# Patient Record
Sex: FEMALE | Race: WHITE | Employment: UNEMPLOYED | URBAN - METROPOLITAN AREA
[De-identification: names, ages, dates, MRNs, and addresses within clinical notes are randomized per-mention and may not be internally consistent; named-entity substitution may affect disease eponyms.]

---

## 2020-10-12 LAB
ABO, EXTERNAL RESULT: NORMAL
HEP B, EXTERNAL RESULT: NEGATIVE
HEPATITIS C ANTIBODY, EXTERNAL RESULT: NEGATIVE
HIV, EXTERNAL RESULT: NON REACTIVE
RH FACTOR, EXTERNAL RESULT: POSITIVE
RUBELLA TITER, EXTERNAL RESULT: NORMAL

## 2021-01-18 LAB — RPR, EXTERNAL RESULT: NON REACTIVE

## 2021-04-05 ENCOUNTER — HOSPITAL ENCOUNTER (INPATIENT)
Age: 26
LOS: 1 days | Discharge: HOME | DRG: 560 | End: 2021-04-06
Attending: OBSTETRICS & GYNECOLOGY | Admitting: OBSTETRICS & GYNECOLOGY
Payer: COMMERCIAL

## 2021-04-05 PROBLEM — Z37.9 NORMAL LABOR: Status: ACTIVE | Noted: 2021-04-05

## 2021-04-05 LAB
A/G RATIO: 1 (ref 1.1–2.2)
ABO/RH: NORMAL
ALBUMIN SERPL-MCNC: 2.9 G/DL (ref 3.4–5)
ALP BLD-CCNC: 181 U/L (ref 40–129)
ALT SERPL-CCNC: 88 U/L (ref 10–40)
AMPHETAMINE SCREEN, URINE: NORMAL
ANION GAP SERPL CALCULATED.3IONS-SCNC: 16 MMOL/L (ref 3–16)
ANTIBODY SCREEN: NORMAL
AST SERPL-CCNC: 58 U/L (ref 15–37)
BARBITURATE SCREEN URINE: NORMAL
BASOPHILS ABSOLUTE: 0.1 K/UL (ref 0–0.2)
BASOPHILS RELATIVE PERCENT: 0.8 %
BENZODIAZEPINE SCREEN, URINE: NORMAL
BILIRUB SERPL-MCNC: 0.3 MG/DL (ref 0–1)
BUN BLDV-MCNC: 9 MG/DL (ref 7–20)
BUPRENORPHINE URINE: NORMAL
CALCIUM SERPL-MCNC: 9.1 MG/DL (ref 8.3–10.6)
CANNABINOID SCREEN URINE: NORMAL
CHLORIDE BLD-SCNC: 104 MMOL/L (ref 99–110)
CO2: 17 MMOL/L (ref 21–32)
COCAINE METABOLITE SCREEN URINE: NORMAL
CREAT SERPL-MCNC: 0.7 MG/DL (ref 0.6–1.1)
EOSINOPHILS ABSOLUTE: 0 K/UL (ref 0–0.6)
EOSINOPHILS RELATIVE PERCENT: 0.2 %
GFR AFRICAN AMERICAN: >60
GFR NON-AFRICAN AMERICAN: >60
GLOBULIN: 3 G/DL
GLUCOSE BLD-MCNC: 90 MG/DL (ref 70–99)
HCT VFR BLD CALC: 32.3 % (ref 36–48)
HEMOGLOBIN: 10.9 G/DL (ref 12–16)
LYMPHOCYTES ABSOLUTE: 1.7 K/UL (ref 1–5.1)
LYMPHOCYTES RELATIVE PERCENT: 20.2 %
Lab: NORMAL
MCH RBC QN AUTO: 29.1 PG (ref 26–34)
MCHC RBC AUTO-ENTMCNC: 33.6 G/DL (ref 31–36)
MCV RBC AUTO: 86.7 FL (ref 80–100)
METHADONE SCREEN, URINE: NORMAL
MONOCYTES ABSOLUTE: 0.5 K/UL (ref 0–1.3)
MONOCYTES RELATIVE PERCENT: 6.4 %
NEUTROPHILS ABSOLUTE: 5.9 K/UL (ref 1.7–7.7)
NEUTROPHILS RELATIVE PERCENT: 72.4 %
OPIATE SCREEN URINE: NORMAL
OXYCODONE URINE: NORMAL
PDW BLD-RTO: 13.2 % (ref 12.4–15.4)
PH UA: 6
PHENCYCLIDINE SCREEN URINE: NORMAL
PLATELET # BLD: 224 K/UL (ref 135–450)
PMV BLD AUTO: 11.3 FL (ref 5–10.5)
POTASSIUM SERPL-SCNC: 4.4 MMOL/L (ref 3.5–5.1)
PROPOXYPHENE SCREEN: NORMAL
RBC # BLD: 3.73 M/UL (ref 4–5.2)
SARS-COV-2, NAAT: NOT DETECTED
SODIUM BLD-SCNC: 137 MMOL/L (ref 136–145)
TOTAL PROTEIN: 5.9 G/DL (ref 6.4–8.2)
TOTAL SYPHILLIS IGG/IGM: NORMAL
WBC # BLD: 8.2 K/UL (ref 4–11)

## 2021-04-05 PROCEDURE — 86850 RBC ANTIBODY SCREEN: CPT

## 2021-04-05 PROCEDURE — 86900 BLOOD TYPING SEROLOGIC ABO: CPT

## 2021-04-05 PROCEDURE — 85025 COMPLETE CBC W/AUTO DIFF WBC: CPT

## 2021-04-05 PROCEDURE — 51701 INSERT BLADDER CATHETER: CPT

## 2021-04-05 PROCEDURE — 87635 SARS-COV-2 COVID-19 AMP PRB: CPT

## 2021-04-05 PROCEDURE — 86780 TREPONEMA PALLIDUM: CPT

## 2021-04-05 PROCEDURE — 2580000003 HC RX 258: Performed by: OBSTETRICS & GYNECOLOGY

## 2021-04-05 PROCEDURE — 0UQMXZZ REPAIR VULVA, EXTERNAL APPROACH: ICD-10-PCS | Performed by: OBSTETRICS & GYNECOLOGY

## 2021-04-05 PROCEDURE — 2500000003 HC RX 250 WO HCPCS

## 2021-04-05 PROCEDURE — 80307 DRUG TEST PRSMV CHEM ANLYZR: CPT

## 2021-04-05 PROCEDURE — 80053 COMPREHEN METABOLIC PANEL: CPT

## 2021-04-05 PROCEDURE — 6370000000 HC RX 637 (ALT 250 FOR IP): Performed by: OBSTETRICS & GYNECOLOGY

## 2021-04-05 PROCEDURE — 86901 BLOOD TYPING SEROLOGIC RH(D): CPT

## 2021-04-05 PROCEDURE — 1220000000 HC SEMI PRIVATE OB R&B

## 2021-04-05 PROCEDURE — 6360000002 HC RX W HCPCS: Performed by: OBSTETRICS & GYNECOLOGY

## 2021-04-05 PROCEDURE — 7200000001 HC VAGINAL DELIVERY

## 2021-04-05 RX ORDER — LIDOCAINE HYDROCHLORIDE 10 MG/ML
INJECTION, SOLUTION EPIDURAL; INFILTRATION; INTRACAUDAL; PERINEURAL
Status: COMPLETED
Start: 2021-04-05 | End: 2021-04-05

## 2021-04-05 RX ORDER — IBUPROFEN 400 MG/1
800 TABLET ORAL EVERY 8 HOURS
Status: DISCONTINUED | OUTPATIENT
Start: 2021-04-05 | End: 2021-04-06 | Stop reason: HOSPADM

## 2021-04-05 RX ORDER — LIDOCAINE HYDROCHLORIDE 10 MG/ML
10 INJECTION, SOLUTION EPIDURAL; INFILTRATION; INTRACAUDAL; PERINEURAL ONCE
Status: COMPLETED | OUTPATIENT
Start: 2021-04-05 | End: 2021-04-05

## 2021-04-05 RX ORDER — LANOLIN 100 %
OINTMENT (GRAM) TOPICAL PRN
Status: DISCONTINUED | OUTPATIENT
Start: 2021-04-05 | End: 2021-04-06 | Stop reason: HOSPADM

## 2021-04-05 RX ORDER — ACETAMINOPHEN 325 MG/1
650 TABLET ORAL EVERY 4 HOURS PRN
Status: DISCONTINUED | OUTPATIENT
Start: 2021-04-05 | End: 2021-04-06 | Stop reason: HOSPADM

## 2021-04-05 RX ORDER — METHYLERGONOVINE MALEATE 0.2 MG/ML
200 INJECTION INTRAVENOUS
Status: ACTIVE | OUTPATIENT
Start: 2021-04-05 | End: 2021-04-05

## 2021-04-05 RX ORDER — HYDROCODONE BITARTRATE AND ACETAMINOPHEN 5; 325 MG/1; MG/1
1 TABLET ORAL EVERY 4 HOURS PRN
Status: DISCONTINUED | OUTPATIENT
Start: 2021-04-05 | End: 2021-04-06 | Stop reason: HOSPADM

## 2021-04-05 RX ORDER — DOCUSATE SODIUM 100 MG/1
100 CAPSULE, LIQUID FILLED ORAL 2 TIMES DAILY
Status: DISCONTINUED | OUTPATIENT
Start: 2021-04-05 | End: 2021-04-06 | Stop reason: HOSPADM

## 2021-04-05 RX ORDER — HYDROCODONE BITARTRATE AND ACETAMINOPHEN 5; 325 MG/1; MG/1
2 TABLET ORAL EVERY 4 HOURS PRN
Status: DISCONTINUED | OUTPATIENT
Start: 2021-04-05 | End: 2021-04-06 | Stop reason: HOSPADM

## 2021-04-05 RX ORDER — SODIUM CHLORIDE 0.9 % (FLUSH) 0.9 %
10 SYRINGE (ML) INJECTION EVERY 12 HOURS SCHEDULED
Status: DISCONTINUED | OUTPATIENT
Start: 2021-04-05 | End: 2021-04-06 | Stop reason: HOSPADM

## 2021-04-05 RX ORDER — SODIUM CHLORIDE 0.9 % (FLUSH) 0.9 %
10 SYRINGE (ML) INJECTION PRN
Status: DISCONTINUED | OUTPATIENT
Start: 2021-04-05 | End: 2021-04-06 | Stop reason: HOSPADM

## 2021-04-05 RX ORDER — LABETALOL HYDROCHLORIDE 5 MG/ML
20 INJECTION, SOLUTION INTRAVENOUS ONCE
Status: DISCONTINUED | OUTPATIENT
Start: 2021-04-05 | End: 2021-04-06 | Stop reason: HOSPADM

## 2021-04-05 RX ORDER — OXYTOCIN 10 [USP'U]/ML
INJECTION, SOLUTION INTRAMUSCULAR; INTRAVENOUS
Status: DISCONTINUED
Start: 2021-04-05 | End: 2021-04-05 | Stop reason: WASHOUT

## 2021-04-05 RX ADMIN — IBUPROFEN 800 MG: 400 TABLET, FILM COATED ORAL at 08:50

## 2021-04-05 RX ADMIN — Medication 166.7 ML: at 03:51

## 2021-04-05 RX ADMIN — IBUPROFEN 800 MG: 400 TABLET, FILM COATED ORAL at 16:28

## 2021-04-05 RX ADMIN — LIDOCAINE HYDROCHLORIDE 10 ML: 10 INJECTION, SOLUTION EPIDURAL; INFILTRATION; INTRACAUDAL; PERINEURAL at 03:46

## 2021-04-05 RX ADMIN — Medication 87.3 MILLI-UNITS/MIN: at 04:02

## 2021-04-05 RX ADMIN — DOCUSATE SODIUM 100 MG: 100 CAPSULE, LIQUID FILLED ORAL at 21:00

## 2021-04-05 RX ADMIN — SODIUM CHLORIDE, PRESERVATIVE FREE 10 ML: 5 INJECTION INTRAVENOUS at 21:00

## 2021-04-05 NOTE — L&D DELIVERY NOTE
Department of Obstetrics and Gynecology  Spontaneous Vaginal Delivery Note      [place]    PRENATAL CARE: Complicated by: none    Pre-operative Diagnosis:  active labor and SROM      Post-operative Diagnosis: The same    Additional Procedures: perineal laceration repair    Infant: living, viable female infant, Apgars 9/9, weight pending kangaroo care      Anesthesia:  none    Specimen: none    QBL: 250 mL     Condition: mother and infant stable in LDR    Delivery Summary: Patient is [place] is a 31 y.o. [id number] female at 37w0d who presented to Labor and Delivery in active labor. She was found to be 9 cm on initial exam. Dr. La Ko was notified and was en route and I was called to care for the patient in the interim as the in house physician. Patient quickly dilated to complete and had an insuppressible urge to push. With subsequent contractions, she pushed for a spontaneous vaginal delivery in [place]. The cord was clamped and cut after 60 seconds. 10 Units of Pitocin in 500 ml of LR was started IV. A superficial right labial laceration was noted; Lidocaine 1% 5mL was injected and the site was repaired with 3-0 Vicryl in standard fashion to achieve hemostasis. Placenta, cord and membranes delivered at 30 minutes. Uterus was not explored. Uterus was firm and at the level of the umbilicus. Vaginal sweep was done, laps count was correct. Mother and  left stable to recovery.      Electronically signed by Kitty Alvardao MD on 2021 at 3:59 AM

## 2021-04-05 NOTE — PLAN OF CARE
Problem: Pain:  Goal: Control of acute pain  Description: Control of acute pain  Outcome: Met This Shift     Problem: Pain:  Goal: Pain level will decrease  Description: Pain level will decrease  Outcome: Ongoing  Goal: Control of chronic pain  Description: Control of chronic pain  Outcome: Ongoing     Problem: Discharge Planning:  Goal: Discharged to appropriate level of care  Description: Discharged to appropriate level of care  Outcome: Ongoing     Problem: Constipation:  Goal: Bowel elimination is within specified parameters  Description: Bowel elimination is within specified parameters  Outcome: Ongoing     Problem: Fluid Volume - Imbalance:  Goal: Absence of imbalanced fluid volume signs and symptoms  Description: Absence of imbalanced fluid volume signs and symptoms  Outcome: Ongoing  Goal: Absence of postpartum hemorrhage signs and symptoms  Description: Absence of postpartum hemorrhage signs and symptoms  Outcome: Ongoing     Problem: Infection - Risk of, Puerperal Infection:  Goal: Will show no infection signs and symptoms  Description: Will show no infection signs and symptoms  Outcome: Ongoing     Problem: Mood - Altered:  Goal: Mood stable  Description: Mood stable  Outcome: Ongoing     Problem: Pain - Acute:  Goal: Pain level will decrease  Description: Pain level will decrease  Outcome: Ongoing

## 2021-04-05 NOTE — LACTATION NOTE
This note was copied from a baby's chart. Lactation Progress Note  Initial Consult    Data: Referral received per RN. Action: LC to room. Mother resting in bed. Infant sleeping, swaddled in mother's arms, showing no hunger cues at this time. Mother states agreeable to consult from [facility] at this time. I reviewed Care Plan for First 24 Hours of Life already in patient binder. Discussed recognizing hunger cues and offering the breast when cues are shown. Encouraged breastfeeding on demand and attempting/offering at least every 3 hours. Informed infant may have one 5 hour stretch of sleep in a 24 hour period. Encouraged unlimited skin to skin contact with infant and reviewed benefits including better temperature, heart rate, respiration, blood pressure, and blood sugar regulation. Also increased bonding and milk supply associated with skin to skin contact. Discussed feeding positions, latch on techniques, signs of milk transfer, output goals and normal feeding/sleeping behaviors. I referred mother to binder for additional information about breastfeeding and skin to skin contact. Discussed hand expression with mother. Mother states she is easily able to hand express drops to infant. Reinforced importance of positioning infant nose to nipple, belly to belly, waiting for wide open mouth, and bringing baby onto breast to ensure a deep latch. Discussed importance of obtaining deep latch to ensure proper milk transfer, milk production and supply and maternal comfort. Mother has breastfeeding hx of attempting with her first child (now 6 years), but switching to bottles shortly after delivery of that child because he wouldn't latch. The mother requests I initiate process for a breast pump through insurance. FOB is bringing insurance card in since it is not in system yet. Will fax pump script once insurance information is put in.     Gave resources for reverse pressure softening and breastfeeding support

## 2021-04-05 NOTE — H&P
Department of Obstetrics and Gynecology   Obstetrics History and Physical        CHIEF COMPLAINT:  contractions    HISTORY OF PRESENT ILLNESS:    Fer Jenkins  is a 31 y.o. [id number] female at 37w0d presents with a chief complaint as above and is being admitted for active phase labor    Estimated Due Date: Estimated Date of Delivery: 21    PRENATAL CARE: Complicated by: none    PAST OB HISTORY:  OB History        3    Para   2    Term   1       1    AB   1    Living   2       SAB        TAB        Ectopic        Molar        Multiple   0    Live Births   2              Past Medical History:    History reviewed. No pertinent past medical history. Past Surgical History:    History reviewed. No pertinent surgical history. Allergies:  Patient has no known allergies.   Social History:    Social History     Socioeconomic History    Marital status:      Spouse name: Not on file    Number of children: Not on file    Years of education: Not on file    Highest education level: Not on file   Occupational History    Not on file   Social Needs    Financial resource strain: Not on file    Food insecurity     Worry: Not on file     Inability: Not on file    Transportation needs     Medical: Not on file     Non-medical: Not on file   Tobacco Use    Smoking status: Never Smoker    Smokeless tobacco: Never Used   Substance and Sexual Activity    Alcohol use: Not Currently    Drug use: Never    Sexual activity: Yes     Partners: Male   Lifestyle    Physical activity     Days per week: Not on file     Minutes per session: Not on file    Stress: Not on file   Relationships    Social connections     Talks on phone: Not on file     Gets together: Not on file     Attends Orthodox service: Not on file     Active member of club or organization: Not on file     Attends meetings of clubs or organizations: Not on file     Relationship status: Not on file    Intimate partner violence     Fear of current or ex partner: Not on file     Emotionally abused: Not on file     Physically abused: Not on file     Forced sexual activity: Not on file   Other Topics Concern    Not on file   Social History Narrative    Not on file     Family History:   History reviewed. No pertinent family history. Medications Prior to Admission:  Medications Prior to Admission: Prenatal MV-Min-Fe Fum-FA-DHA (PRENATAL 1 PO), Take by mouth    REVIEW OF SYSTEMS:  negative     PHYSICAL EXAM:    Vitals:    04/05/21 0513 04/05/21 0549 04/05/21 0557 04/05/21 0631   BP: (!) 159/83 (!) 175/90 (!) 176/90 (!) 157/84   Pulse: 65 64 64 63   Resp:       Temp:       TempSrc:       Weight:       Height:         General appearance:  awake, alert, cooperative, no apparent distress, and appears stated age  Neurologic:  Awake, alert, oriented to name, place and time.     Lungs:  No increased work of breathing, good air exchange  Abdomen:  Soft, non tender, gravid, fundal height consistent with the gestational age, EFW by Leopold's maneuvers is AGA  Pelvis:  Adequate pelvis  Cervix: 9cm on admit  Contraction frequency: every 2 minutes  Membranes:  Intact  Labs:   CBC:   Lab Results   Component Value Date    WBC 8.2 04/05/2021    RBC 3.73 04/05/2021    HGB 10.9 04/05/2021    HCT 32.3 04/05/2021    MCV 86.7 04/05/2021    MCH 29.1 04/05/2021    MCHC 33.6 04/05/2021    RDW 13.2 04/05/2021     04/05/2021    MPV 11.3 04/05/2021       ASSESSMENT:  <principal problem not specified>    PLAN: Admit  Labor: Routine labor orders  Fetus: Reassuring  GBS: Negative    Electronically signed by Samm Reeves MD on 4/5/2021 at 7:49 AM

## 2021-04-05 NOTE — FLOWSHEET NOTE
Pt taken to triage via wheelchair by primary RN and Charge RN. Pt panting stating she felt a \"pop\", took a bath and became increasing uncomfortable while at home. Pt at this time stating she is feeling some pressure/pain but denies pressure with no contraction. Pt at standing at bedside panting/gagging, assisted to bed. SVE 9/100.  notified and en route. Pt transferred to L&D room 1928. Admission history obtained. Iv started.

## 2021-04-05 NOTE — H&P
Fear of current or ex partner: Not on file     Emotionally abused: Not on file     Physically abused: Not on file     Forced sexual activity: Not on file   Other Topics Concern    Not on file   Social History Narrative    Not on file     Family History:   History reviewed. No pertinent family history. Medications Prior to Admission:  Medications Prior to Admission: Prenatal MV-Min-Fe Fum-FA-DHA (PRENATAL 1 PO), Take by mouth    REVIEW OF SYSTEMS:  Active labor      PHYSICAL EXAM:    Vitals:    04/05/21 0513 04/05/21 0549 04/05/21 0557 04/05/21 0631   BP: (!) 159/83 (!) 175/90 (!) 176/90 (!) 157/84   Pulse: 65 64 64 63   Resp:       Temp:       TempSrc:       Weight:       Height:         General appearance:  awake, alert, cooperative, no apparent distress, and appears stated age  Neurologic:  Awake, alert, oriented to name, place and time.     Lungs:  No increased work of breathing, good air exchange  Abdomen:  Soft, non tender, gravid, fundal height consistent with the gestational age  Pelvis:  Adequate pelvis  Cervix: 9/100  Contraction frequency: every 2 minutes  Membranes:  Ruptured   Labs: GBS negative, Covid negative     ASSESSMENT:  Pt is a 26y/o [id number] at 37w0d presenting in active labor    PLAN: Admit  Labor: Routine labor orders, precipitous labor and delivery   Fetus: Reassuring  GBS: negative     Electronically signed by Reina Coleman MD on 4/5/2021 at 7:26 AM

## 2021-04-05 NOTE — FLOWSHEET NOTE
Pt c/o urge to void/pressure moderate bleeding noted. Assisted pt to RR, unable to void, lemon sized clot passed. Assisted back to bed bladder emptied per straight cath, 100 ml of jayant urine noted. Small clot passed. Fundus firm U/U, bleeding small.

## 2021-04-05 NOTE — FLOWSHEET NOTE
RN called lab to verify un resulted lab work.  said sample was just taken back and to \"give it a little while\".

## 2021-04-06 VITALS
SYSTOLIC BLOOD PRESSURE: 151 MMHG | BODY MASS INDEX: 40.32 KG/M2 | HEIGHT: 59 IN | HEART RATE: 91 BPM | TEMPERATURE: 98 F | DIASTOLIC BLOOD PRESSURE: 94 MMHG | RESPIRATION RATE: 16 BRPM | WEIGHT: 200 LBS

## 2021-04-06 PROCEDURE — 6370000000 HC RX 637 (ALT 250 FOR IP): Performed by: OBSTETRICS & GYNECOLOGY

## 2021-04-06 PROCEDURE — 2580000003 HC RX 258: Performed by: OBSTETRICS & GYNECOLOGY

## 2021-04-06 RX ORDER — NIFEDIPINE 30 MG/1
30 TABLET, EXTENDED RELEASE ORAL DAILY
Status: DISCONTINUED | OUTPATIENT
Start: 2021-04-06 | End: 2021-04-06 | Stop reason: HOSPADM

## 2021-04-06 RX ORDER — NIFEDIPINE 30 MG/1
30 TABLET, FILM COATED, EXTENDED RELEASE ORAL DAILY
Qty: 30 TABLET | Refills: 3 | Status: SHIPPED | OUTPATIENT
Start: 2021-04-07 | End: 2021-05-07

## 2021-04-06 RX ADMIN — IBUPROFEN 800 MG: 400 TABLET, FILM COATED ORAL at 17:55

## 2021-04-06 RX ADMIN — DOCUSATE SODIUM 100 MG: 100 CAPSULE, LIQUID FILLED ORAL at 09:24

## 2021-04-06 RX ADMIN — Medication 10 ML: at 09:25

## 2021-04-06 RX ADMIN — IBUPROFEN 800 MG: 400 TABLET, FILM COATED ORAL at 00:29

## 2021-04-06 RX ADMIN — NIFEDIPINE 30 MG: 30 TABLET, FILM COATED, EXTENDED RELEASE ORAL at 09:24

## 2021-04-06 RX ADMIN — IBUPROFEN 800 MG: 400 TABLET, FILM COATED ORAL at 09:24

## 2021-04-06 ASSESSMENT — PAIN - FUNCTIONAL ASSESSMENT
PAIN_FUNCTIONAL_ASSESSMENT: ACTIVITIES ARE NOT PREVENTED

## 2021-04-06 ASSESSMENT — PAIN SCALES - GENERAL
PAINLEVEL_OUTOF10: 0

## 2021-04-06 NOTE — LACTATION NOTE
This note was copied from a baby's chart. Lation Progress Note    Data:    Follow-up     Action: I introduced myself and lactation services. White board updated with name and number and mother encouraged to call for any lactation needs. Mother verbalized breastfeeding going well and no concerns at this time. Mother encouraged to call for one feed    Response: Mother verbalized understanding denies further needs at this time. Mother verbalized having breast pump at home.

## 2021-04-06 NOTE — FLOWSHEET NOTE

## 2021-04-06 NOTE — FLOWSHEET NOTE
Dr. Natasha Pinedo came in to see patient when I was assessing her and is aware of her blood pressure; patient denies any high blood pressure symptoms (no headache, no nausea or vomiting, no pain, feels great, and is up and walking around the room). Patient wants to be discharged today; plan is to start Procardia XR 30 mg and possible discharge this afternoon.

## 2021-04-06 NOTE — PLAN OF CARE
Problem: Pain:  Description: Pain management should include both nonpharmacologic and pharmacologic interventions. Goal: Pain level will decrease  Description: Pain level will decrease  4/6/2021 0526 by Chuckie Hampton RN  Outcome: Met This Shift  Note: Has had no complaints of pain. Motrin given as ordered to keep her pain level down. Problem: Discharge Planning:  Goal: Discharged to appropriate level of care  Description: Discharged to appropriate level of care  4/6/2021 0526 by Chuckie Hampton RN  Outcome: Ongoing  Note: Anticipate discharge to home     Problem: Constipation:  Goal: Bowel elimination is within specified parameters  Description: Bowel elimination is within specified parameters  4/6/2021 0526 by Chuckie Hampton RN  Outcome: Ongoing  Note: Tolerating a regular diet. Abdomen soft with active bowel sounds. Colace given as ordered. Problem: Fluid Volume - Imbalance:  Goal: Absence of imbalanced fluid volume signs and symptoms  Description: Absence of imbalanced fluid volume signs and symptoms  4/6/2021 0526 by Chuckie Hampton RN  Outcome: Met This Shift     Problem: Fluid Volume - Imbalance:  Goal: Absence of postpartum hemorrhage signs and symptoms  Description: Absence of postpartum hemorrhage signs and symptoms  4/6/2021 0526 by Chuckie Hampton RN  Outcome: Met This Shift  Note: Bleeding has been mild with no clots or odor     Problem: Infection - Risk of, Puerperal Infection:  Goal: Will show no infection signs and symptoms  Description: Will show no infection signs and symptoms  4/6/2021 0526 by Chuckie Hampton RN  Outcome: Met This Shift     Problem: Mood - Altered:  Goal: Mood stable  Description: Mood stable  4/6/2021 0526 by Chuckie Hampton RN  Outcome: Met This Shift  Note: Has been cooperative and friendly. Is independent in caring for her needs and that of her baby.   Breast feeding at intervals

## 2021-04-06 NOTE — PROGRESS NOTES
Department of Obstetrics and Gynecology  Vaginal Delivery Postpartum Rounds    SUBJECTIVE:  Pain is controlled with non-steroidal anti-inflammatory drugs. Her lochia is normal. She denies HA, epig pain, scotomata, swelling     OBJECTIVE:  Vital Signs: /92 Pulse 98   Temp 97.8 °F (36.6 °C) (Oral)   Resp 16   Ht 4' 11\" (1.499 m)   Wt 200 lb (90.7 kg)   Breastfeeding Unknown   BMI 40.40 kg/m²   Appearance/Psychiatric: awake, alert, cooperative, no apparent distress, appears stated age  Constitutional: The patient is well nourished. Cardiovascular: She does not have edema. Respiratory: Respiratory effort is normal.  Gastrointestinal: Soft, non tender, uterine fundus is firm below umbilicus  Extremities: nontender to palpation  Perineum: intact     LABS / IMAGING:    Lab Results   Component Value Date    WBC 8.2 2021    RBC 3.73 2021    HGB 10.9 2021    HCT 32.3 2021    MCV 86.7 2021    MCH 29.1 2021    MCHC 33.6 2021    RDW 13.2 2021     2021    MPV 11.3 2021     Lab Results   Component Value Date     2021    K 4.4 2021     2021    CO2 17 2021    BUN 9 2021    CREATININE 0.7 2021    GLUCOSE 90 2021    CALCIUM 9.1 2021     No results found for: POCGLU  Lab Results   Component Value Date    ALKPHOS 181 2021    ALT 88 2021    AST 58 2021    PROT 5.9 2021    BILITOT 0.3 2021    LABALBU 2.9 2021     Lab Results   Component Value Date    PHUR 6.0 2021     No results found for: LABRPR    ASSESSMENT:    Postpartum Day 1 s/p   1. Preeclampsia without severe features  -start Procardia XL 30 mg/d. Pt is asymptomatic, but BP is mildly elevated on morning check. PLAN:   1. Continue routine postpartum care   2. Discharge home on Postpartum Day 2  3. Return to office in 1 week for BP check. Discussed at length s/sx of preE.    4. Postpartum instructions reviewed and all patient's Questions answered    Electronically signed by Indira Ohara MD on 4/6/2021 at 8:01 AM

## 2021-04-06 NOTE — FLOWSHEET NOTE
Resting in bed, offers no complaints of pain, headache or dizziness or n/v. Tolerating a regular diet, drinking fluids. Bleeding is mild with no clots or odor. Breast feeding at intervals. Very pleasant and talkative.

## 2021-04-06 NOTE — DISCHARGE SUMMARY
Department of Obstetrics and Gynecology  Postpartum Discharge Summary      Admit Date: 2021    Admit Diagnosis: Normal labor [O80, Z37.9]    Discharge Date: 21  Any delay in discharge from ordered D/C date due to  factors. Discharge Diagnoses: spontaneous vaginal delivery     [place] [address] Medication Instructions IRMA:[id number]    Printed on:21 2313   Medication Information                      NIFEdipine (ADALAT CC) 30 MG extended release tablet  Take 1 tablet by mouth daily             Prenatal MV-Min-Fe Fum-FA-DHA (PRENATAL 1 PO)  Take by mouth                 Service: Obstetrics    Consults: none    Significant Diagnostic Studies: none    Postpartum complications: Developed mild range BP postpartum, remained asynmptomatic, was started on Procardia XL 30 mg/d     Condition at Discharge: good    Hospital Course: uncomplicated    Discharge Instructions: Activity: as tolerated    Diet: regular diet    Instructions: No intercourse and nothing in the vagina for 6 weeks. Do not drive while using pain medications.  Keep any wounds clean and dry    Discharge to: Home    Disposition / Follow up: Return to office in 1 week for BP check     Home Health Nurse visit within 24-48 h if qualifies    [place] Data:  Apgars:  Information for the patient's :  Kate Ross [[id number]]   APGAR One: 9     Information for the patient's :  Kate Ross [[id number]]   APGAR Five: 9     Birth Weight:  Information for the patient's :  Kate Ross [[id number]]   Birth Weight: 6 lb 0.3 oz (2.73 kg)     Home with mother    Electronically signed by Lux Orellana MD on 2021 at 5:35 PM

## 2021-04-06 NOTE — PROGRESS NOTES
Patient is doing well this am, she is asymptomatic, denies any pain, HA, or swelling. She would like to be d/c to home today. She was started on Procardia XL 30 mg/d and will continue at home for 4 weeks. She made an appt in the office for BP check in 1 week. Nurses and I discussed at length the preE sx and she is to inform the office RN or MD on call if develops any. She was anxious prior her recent BP check. She would like to be d/c to home tonight. Rx was sent to her pharmacy.  All Qs answered

## 2021-04-06 NOTE — FLOWSHEET NOTE
Resting in bed. Offers no complaints of pain. Voiding well, drinking fluids. Bleeding is mild with no clots or odor. Breast feeding at intervals, needs no assistance.